# Patient Record
Sex: FEMALE | Race: WHITE
[De-identification: names, ages, dates, MRNs, and addresses within clinical notes are randomized per-mention and may not be internally consistent; named-entity substitution may affect disease eponyms.]

---

## 2018-02-20 ENCOUNTER — HOSPITAL ENCOUNTER (EMERGENCY)
Dept: HOSPITAL 17 - NEPD | Age: 28
Discharge: ANOTHER HOSPITAL | End: 2018-02-20
Payer: MEDICAID

## 2018-02-20 VITALS — HEIGHT: 65 IN | WEIGHT: 147.93 LBS | BODY MASS INDEX: 24.65 KG/M2

## 2018-02-20 VITALS
RESPIRATION RATE: 18 BRPM | OXYGEN SATURATION: 97 % | DIASTOLIC BLOOD PRESSURE: 68 MMHG | TEMPERATURE: 98.7 F | HEART RATE: 83 BPM | SYSTOLIC BLOOD PRESSURE: 129 MMHG

## 2018-02-20 VITALS
HEART RATE: 70 BPM | DIASTOLIC BLOOD PRESSURE: 55 MMHG | OXYGEN SATURATION: 100 % | RESPIRATION RATE: 18 BRPM | SYSTOLIC BLOOD PRESSURE: 121 MMHG

## 2018-02-20 DIAGNOSIS — M54.9: ICD-10-CM

## 2018-02-20 DIAGNOSIS — R39.15: Primary | ICD-10-CM

## 2018-02-20 DIAGNOSIS — F17.200: ICD-10-CM

## 2018-02-20 LAB
ALBUMIN SERPL-MCNC: 3.8 GM/DL (ref 3.4–5)
ALP SERPL-CCNC: 88 U/L (ref 45–117)
ALT SERPL-CCNC: 12 U/L (ref 10–53)
AST SERPL-CCNC: 11 U/L (ref 15–37)
BASOPHILS # BLD AUTO: 0 TH/MM3 (ref 0–0.2)
BASOPHILS NFR BLD: 0.5 % (ref 0–2)
BILIRUB SERPL-MCNC: 0.3 MG/DL (ref 0.2–1)
BUN SERPL-MCNC: 21 MG/DL (ref 7–18)
CALCIUM SERPL-MCNC: 9.2 MG/DL (ref 8.5–10.1)
CHLORIDE SERPL-SCNC: 105 MEQ/L (ref 98–107)
COLOR UR: COLORLESS
CREAT SERPL-MCNC: 0.8 MG/DL (ref 0.5–1)
EOSINOPHIL # BLD: 0.1 TH/MM3 (ref 0–0.4)
EOSINOPHIL NFR BLD: 0.7 % (ref 0–4)
ERYTHROCYTE [DISTWIDTH] IN BLOOD BY AUTOMATED COUNT: 13.2 % (ref 11.6–17.2)
GFR SERPLBLD BASED ON 1.73 SQ M-ARVRAT: 87 ML/MIN (ref 89–?)
GLUCOSE SERPL-MCNC: 81 MG/DL (ref 74–106)
GLUCOSE UR STRIP-MCNC: (no result) MG/DL
HCO3 BLD-SCNC: 27.4 MEQ/L (ref 21–32)
HCT VFR BLD CALC: 37.6 % (ref 35–46)
HGB BLD-MCNC: 12.7 GM/DL (ref 11.6–15.3)
HGB UR QL STRIP: (no result)
KETONES UR STRIP-MCNC: (no result) MG/DL
LYMPHOCYTES # BLD AUTO: 2 TH/MM3 (ref 1–4.8)
LYMPHOCYTES NFR BLD AUTO: 24.2 % (ref 9–44)
MCH RBC QN AUTO: 29.6 PG (ref 27–34)
MCHC RBC AUTO-ENTMCNC: 33.8 % (ref 32–36)
MCV RBC AUTO: 87.3 FL (ref 80–100)
MONOCYTE #: 0.5 TH/MM3 (ref 0–0.9)
MONOCYTES NFR BLD: 6.1 % (ref 0–8)
NEUTROPHILS # BLD AUTO: 5.5 TH/MM3 (ref 1.8–7.7)
NEUTROPHILS NFR BLD AUTO: 68.5 % (ref 16–70)
NITRITE UR QL STRIP: (no result)
PLATELET # BLD: 315 TH/MM3 (ref 150–450)
PMV BLD AUTO: 7.6 FL (ref 7–11)
PROT SERPL-MCNC: 7.5 GM/DL (ref 6.4–8.2)
RBC # BLD AUTO: 4.3 MIL/MM3 (ref 4–5.3)
SODIUM SERPL-SCNC: 139 MEQ/L (ref 136–145)
SP GR UR STRIP: 1.01 (ref 1–1.03)
SQUAMOUS #/AREA URNS HPF: <1 /HPF (ref 0–5)
URINE LEUKOCYTE ESTERASE: (no result)
WBC # BLD AUTO: 8.1 TH/MM3 (ref 4–11)

## 2018-02-20 PROCEDURE — 99284 EMERGENCY DEPT VISIT MOD MDM: CPT

## 2018-02-20 PROCEDURE — 96360 HYDRATION IV INFUSION INIT: CPT

## 2018-02-20 PROCEDURE — 74176 CT ABD & PELVIS W/O CONTRAST: CPT

## 2018-02-20 PROCEDURE — 84703 CHORIONIC GONADOTROPIN ASSAY: CPT

## 2018-02-20 PROCEDURE — 85025 COMPLETE CBC W/AUTO DIFF WBC: CPT

## 2018-02-20 PROCEDURE — 80053 COMPREHEN METABOLIC PANEL: CPT

## 2018-02-20 PROCEDURE — 83690 ASSAY OF LIPASE: CPT

## 2018-02-20 PROCEDURE — 81001 URINALYSIS AUTO W/SCOPE: CPT

## 2018-02-20 NOTE — PD
HPI


Chief Complaint:   Complaint


Time Seen by Provider:  18:31


Travel History


International Travel<30 days:  No


Contact w/Intl Traveler<30days:  No


Traveled to known affect area:  No





History of Present Illness


HPI


an ex parte arrived from [facility] for medical clearance........states h/o of 

kidney infections, here c/o back pain for past week along with urinary 

frequency and urgency so she is concerned about having another urinary 

infection.  denies alleviating/aggravating factors.  denies assoc factors such 

as fever, n/v/d/abdominal pain/cp at this time.





PFSH


Past Medical History


Bipolar Disorder:  Yes


Anxiety:  Yes


Depression:  Yes


Immunizations Current:  Yes


Migraines:  Yes


Pregnant?:  Not Pregnant


:  1





Social History


Alcohol Use:  No


Tobacco Use:  Yes ( ppd)


Substance Use:  Yes (STATES SHE QUIT METH )





Allergies-Medications


(Allergen,Severity, Reaction):  


Coded Allergies:  


     Opioids - Morphine Analogues (Verified  Allergy, Intermediate, Nausea/

Vomiting, 18)


     *MDRO Multi-Drug Resistant Organism (Verified  Adverse Reaction, Unknown, )


 ESBL+ E. coli urine 2015.


Reported Meds & Prescriptions





Reported Meds & Active Scripts


Active








Review of Systems


General / Constitutional:  No: Fever


Eyes:  No: Visual changes


HENT:  No: Headaches


Cardiovascular:  No: Chest Pain or Discomfort


Respiratory:  No: Shortness of Breath


Gastrointestinal:  No: Abdominal Pain


Genitourinary:  Positive: Urgency, Frequency


Musculoskeletal:  No: Pain


Skin:  No Rash


Neurologic:  No: Weakness


Psychiatric:  No: Depression


Endocrine:  No: Polydipsia


Hematologic/Lymphatic:  No: Easy Bruising





Physical Exam


Narrative


GENERAL: 


SKIN: Warm and dry.


HEAD: Atraumatic. Normocephalic. 


EYES: Pupils equal and round. No scleral icterus. No injection or drainage. 


ENT: No nasal bleeding or discharge.  Mucous membranes pink and moist.


NECK: Trachea midline. No JVD. 


CARDIOVASCULAR: Regular rate and rhythm.  


RESPIRATORY: No accessory muscle use. Clear to auscultation. Breath sounds 

equal bilaterally. 


GASTROINTESTINAL: Abdomen soft, non-tender, nondistended


MUSCULOSKELETAL: Extremities without clubbing, cyanosis, or edema. No obvious 

deformities. 


NEUROLOGICAL: Awake and alert. No obvious cranial nerve deficits.  Motor 

grossly within normal limits. Five out of 5 muscle strength in the arms and 

legs.  Normal speech.


PSYCHIATRIC: Agitated mood and affect; insight and judgment normal.





Data


Data


Last Documented VS





Vital Signs








  Date Time  Temp Pulse Resp B/P (MAP) Pulse Ox O2 Delivery O2 Flow Rate FiO2


 


18 16:59 98.7 83 18 129/68 (88) 97 Room Air  








Orders





 Orders


Complete Blood Count With Diff (18 17:18)


Comprehensive Metabolic Panel (18 17:18)


Urinalysis - C+S If Indicated (18 17:18)


Ed Urine Pregnancytest Poc (18 17:18)


Ct Abd/Pel W/O Iv Contrast (18 18:31)


Ecg Monitoring (18 18:31)


Iv Access Insert/Monitor (18 18:31)


Sodium Chloride 0.9% Flush (Ns Flush) (18 18:45)


Sodium Chlor 0.9% 1000 Ml Inj (Ns 1000 M (18 18:31)


Lipase (18 18:31)





Labs





Laboratory Tests








Test


  18


18:02 18


18:20


 


Urine Color COLORLESS  


 


Urine Turbidity CLEAR  


 


Urine pH 6.5  


 


Urine Specific Gravity 1.006  


 


Urine Protein NEG mg/dL  


 


Urine Glucose (UA) NEG mg/dL  


 


Urine Ketones NEG mg/dL  


 


Urine Occult Blood NEG  


 


Urine Nitrite NEG  


 


Urine Bilirubin NEG  


 


Urine Urobilinogen


  LESS THAN 2.0


MG/DL 


 


 


Urine Leukocyte Esterase NEG  


 


Urine RBC


  LESS THAN 1


/hpf 


 


 


Urine WBC 1 /hpf  


 


Urine Squamous Epithelial


Cells <1 /hpf 


  


 


 


Microscopic Urinalysis Comment


  CULT NOT


INDICATED 


 


 


White Blood Count  8.1 TH/MM3 


 


Red Blood Count  4.30 MIL/MM3 


 


Hemoglobin  12.7 GM/DL 


 


Hematocrit  37.6 % 


 


Mean Corpuscular Volume  87.3 FL 


 


Mean Corpuscular Hemoglobin  29.6 PG 


 


Mean Corpuscular Hemoglobin


Concent 


  33.8 % 


 


 


Red Cell Distribution Width  13.2 % 


 


Platelet Count  315 TH/MM3 


 


Mean Platelet Volume  7.6 FL 


 


Neutrophils (%) (Auto)  68.5 % 


 


Lymphocytes (%) (Auto)  24.2 % 


 


Monocytes (%) (Auto)  6.1 % 


 


Eosinophils (%) (Auto)  0.7 % 


 


Basophils (%) (Auto)  0.5 % 


 


Neutrophils # (Auto)  5.5 TH/MM3 


 


Lymphocytes # (Auto)  2.0 TH/MM3 


 


Monocytes # (Auto)  0.5 TH/MM3 


 


Eosinophils # (Auto)  0.1 TH/MM3 


 


Basophils # (Auto)  0.0 TH/MM3 


 


CBC Comment  DIFF FINAL 


 


Differential Comment   


 


Blood Urea Nitrogen  21 MG/DL 


 


Creatinine  0.80 MG/DL 


 


Random Glucose  81 MG/DL 


 


Total Protein  7.5 GM/DL 


 


Albumin  3.8 GM/DL 


 


Calcium Level  9.2 MG/DL 


 


Alkaline Phosphatase  88 U/L 


 


Aspartate Amino Transf


(AST/SGOT) 


  11 U/L 


 


 


Alanine Aminotransferase


(ALT/SGPT) 


  12 U/L 


 


 


Total Bilirubin  0.3 MG/DL 


 


Sodium Level  139 MEQ/L 


 


Potassium Level  3.9 MEQ/L 


 


Chloride Level  105 MEQ/L 


 


Carbon Dioxide Level  27.4 MEQ/L 


 


Anion Gap  7 MEQ/L 


 


Estimat Glomerular Filtration


Rate 


  87 ML/MIN 


 


 


Lipase  103 U/L 











[facility]


Medical Decision Making


Medical Screen Exam Complete:  Yes


Emergency Medical Condition:  Yes


Medical Record Reviewed:  Yes


Differential Diagnosis


PYELO V UTI V KIDNEY STONE


Narrative Course


CT NEGATIVE FOR ACTIVE URETERAL STONES, NO PYELO.  ua neg for 

uti.................cmp is neg for electrolyte abnl, nl lft's and nl 

lipase....no leukocytosis, no anemia....MEDICALLY CLEARED TO RETURN TO [facility] 

AS PER HER EX PARTE





Diagnosis





 Primary Impression:  


 MEDICALLY CLEARED


Disposition:  70 TRANSFER TO OTHER FACILITY


Condition:  Stable











Chi Farrell MD 2018 19:21

## 2018-02-20 NOTE — RADRPT
EXAM DATE/TIME:  02/20/2018 20:30 

 

[facility] COMPARISON:     

No previous studies available for comparison.

 

 

INDICATIONS :     

Right flank pain. Dysuria. 

                  

 

ORAL CONTRAST:      

No oral contrast ingested.

                  

 

RADIATION DOSE:     

11.97 CTDIvol (mGy) 

 

 

MEDICAL HISTORY :     

None  

 

SURGICAL HISTORY :      

None. 

 

ENCOUNTER:      

Initial

 

ACUITY:      

2 days

 

PAIN SCALE:      

10/10

 

LOCATION:       

Right flank 

 

TECHNIQUE:     

Volumetric scanning of the abdomen and pelvis was performed.  Using automated exposure control and ad
justment of the mA and/or kV according to patient size, radiation dose was kept as low as reasonably 
achievable to obtain optimal diagnostic quality images.  DICOM format image data is available electro
nically for review and comparison.  

 

FINDINGS:     

 

LOWER LUNGS:     

The visualized lower lungs are clear.

 

LIVER:     

Visualized portions are unremarkable. No significant calcified gallstones.

 

SPLEEN:     

This was portions are unremarkable.

 

PANCREAS:     

Within normal limits. 

 

KIDNEYS:     

Punctate 2 mm calcified calculus in the mid right kidney. Kidneys are otherwise symmetrical in size w
ithout evidence for hydronephrosis or contour deforming abnormality.

 

ADRENAL GLANDS:     

Within normal limits.

 

VASCULAR:     

There is no aortic aneurysm.

 

BOWEL/MESENTERY:     

The stomach, small bowel, and colon demonstrate no acute abnormality. Appendix is visualized and norm
al in appearance. Moderate amount of stool throughout the colon. There is no free intraperitoneal air
 or fluid. 

 

ABDOMINAL WALL:     

Within normal limits.

 

RETROPERITONEUM:     

There is no lymphadenopathy.

 

BLADDER:     

No wall thickening or mass.

 

REPRODUCTIVE:     

Within normal limits.

 

INGUINAL:     

There is no lymphadenopathy or hernia.

 

MUSCULOSKELETAL:     

Within normal limits for patient age.

 

CONCLUSION:     

1. Punctate 2 mm nonobstructing calyceal calculus in the mid right kidney. Otherwise, no obstructive 
uropathy.

2. Normal appendix.

3. Moderate stool throughout colon may reflect constipation.

 

 

 

 Ronal Driscoll MD on February 20, 2018 at 21:15           

Board Certified Radiologist.

 This report was verified electronically.